# Patient Record
Sex: MALE | Race: BLACK OR AFRICAN AMERICAN | URBAN - METROPOLITAN AREA
[De-identification: names, ages, dates, MRNs, and addresses within clinical notes are randomized per-mention and may not be internally consistent; named-entity substitution may affect disease eponyms.]

---

## 2021-06-30 DIAGNOSIS — L02.01 CUTANEOUS ABSCESS OF FACE: Primary | ICD-10-CM

## 2021-06-30 LAB
GRAM STN SPEC: ABNORMAL
GRAM STN SPEC: ABNORMAL
Lab: ABNORMAL
SPECIMEN SOURCE: ABNORMAL

## 2021-06-30 PROCEDURE — 87205 SMEAR GRAM STAIN: CPT | Performed by: DENTIST

## 2021-06-30 PROCEDURE — 87070 CULTURE OTHR SPECIMN AEROBIC: CPT | Performed by: DENTIST

## 2021-06-30 PROCEDURE — 87186 SC STD MICRODIL/AGAR DIL: CPT | Performed by: DENTIST

## 2021-06-30 PROCEDURE — 87077 CULTURE AEROBIC IDENTIFY: CPT | Performed by: DENTIST

## 2021-06-30 PROCEDURE — 87075 CULTR BACTERIA EXCEPT BLOOD: CPT | Performed by: DENTIST

## 2021-07-03 LAB
BACTERIA SPEC CULT: ABNORMAL
Lab: ABNORMAL
SPECIMEN SOURCE: ABNORMAL

## 2021-07-07 LAB
BACTERIA SPEC CULT: NORMAL
BACTERIA SPEC CULT: NORMAL
Lab: NORMAL
SPECIMEN SOURCE: NORMAL

## 2021-08-16 ENCOUNTER — ALLIED HEALTH/NURSE VISIT (OUTPATIENT)
Dept: FAMILY MEDICINE | Facility: CLINIC | Age: 31
End: 2021-08-16
Payer: COMMERCIAL

## 2021-08-16 ENCOUNTER — OFFICE VISIT (OUTPATIENT)
Dept: FAMILY MEDICINE | Facility: CLINIC | Age: 31
End: 2021-08-16
Payer: COMMERCIAL

## 2021-08-16 VITALS
WEIGHT: 154.2 LBS | HEART RATE: 71 BPM | SYSTOLIC BLOOD PRESSURE: 115 MMHG | TEMPERATURE: 98.1 F | RESPIRATION RATE: 20 BRPM | OXYGEN SATURATION: 99 % | DIASTOLIC BLOOD PRESSURE: 79 MMHG

## 2021-08-16 DIAGNOSIS — Z23 HIGH PRIORITY FOR 2019-NCOV VACCINE: Primary | ICD-10-CM

## 2021-08-16 DIAGNOSIS — L89.892 PRESSURE INJURY OF LEFT KNEE, STAGE 2 (H): ICD-10-CM

## 2021-08-16 DIAGNOSIS — L03.116 CELLULITIS OF LEFT LOWER EXTREMITY: ICD-10-CM

## 2021-08-16 DIAGNOSIS — Z51.89 ENCOUNTER FOR WOUND CARE: Primary | ICD-10-CM

## 2021-08-16 PROCEDURE — 99204 OFFICE O/P NEW MOD 45 MIN: CPT | Mod: 25 | Performed by: STUDENT IN AN ORGANIZED HEALTH CARE EDUCATION/TRAINING PROGRAM

## 2021-08-16 PROCEDURE — 91301 COVID-19,PF,MODERNA: CPT | Performed by: STUDENT IN AN ORGANIZED HEALTH CARE EDUCATION/TRAINING PROGRAM

## 2021-08-16 PROCEDURE — 0011A COVID-19,PF,MODERNA: CPT | Performed by: STUDENT IN AN ORGANIZED HEALTH CARE EDUCATION/TRAINING PROGRAM

## 2021-08-16 PROCEDURE — 99207 PR NO CHARGE NURSE ONLY: CPT

## 2021-08-16 RX ORDER — BACITRACIN ZINC 500 [USP'U]/G
OINTMENT TOPICAL 2 TIMES DAILY
Qty: 425 G | Refills: 1 | Status: SHIPPED | OUTPATIENT
Start: 2021-08-16

## 2021-08-16 RX ORDER — TRAMADOL HYDROCHLORIDE 50 MG/1
50 TABLET ORAL 2 TIMES DAILY
COMMUNITY

## 2021-08-16 RX ORDER — IBUPROFEN 600 MG/1
600 TABLET, FILM COATED ORAL EVERY 6 HOURS PRN
COMMUNITY

## 2021-08-16 NOTE — PROGRESS NOTES
Preceptor Attestation:  I discussed the patient with the resident and evaluated the patient in person. I have verified the content of the note, which accurately reflects my assessment of the patient and the plan of care.  Supervising Physician:  Breanna Henry MD.

## 2021-08-16 NOTE — PROGRESS NOTES
"Pt presented to [facility] complaining of a wound on his LLE, anterior, a few inches below the knee. Pt endorses being caught in the rain on Friday \"for 4 to 5 hours\", and his soaking-wet jeans rubbed against that spot. Pt denies any other known trauma to that area.    Wound is around 15 mm in diameter, is pink and white with spots of blood but no scabbing, and is crater-like in appearance. It looks to this RN like a Stage 2 pressure injury.    There is a much smaller secondary wound more proximate to the knee (above and to the right of the primary wound). It is not (yet?) cratered, and is similar in appearance to an insect bite that's been scratched.    This RN irrigated the primary wound, dried it with gauze, and applied a 2\" x 3.75\" band-aid with bacitracin ointment on its pad. (Would have applied a Mepilex dressing, but only have one size here and it would have been much too large.) The band-aid covers both wounds.    Pt to be examined later today by Dr. Cole at [facility] at 1330.    Anival Guillen, RN  [facility]  " Bedside and Verbal shift change report given to Temi Biswas RN (oncoming nurse) by Tasha Crawford RN (offgoing nurse). Report included the following information SBAR, Kardex, Intake/Output, MAR, Recent Results and Med Rec Status.

## 2021-08-16 NOTE — PROGRESS NOTES
"[facility] Visit    Assessment & Plan     1. High priority for 2019-nCoV vaccine  - COVID 19 vaccine today  - Second dose in 4 months    2. Cellulitis of left lower extremity  - amoxicillin-clavulanate (AUGMENTIN) 875-125 MG tablet; Take 1 tablet by mouth 2 times daily for 10 days  Dispense: 20 tablet; Refill: 0  - bacitracin 500 UNIT/GM external ointment; Apply topically 2 times daily  Dispense: 425 g; Refill: 1    3. Pressure injury of left knee, stage 2 (H)  Continue follow-up with wound care with [facility] RN. Recheck in 10 days at [facility].      Patient Instructions   Take the antibiotic twice daily for 10 days. If increased redness, fevers, chills return to clinic for further evaluation.    Use the cream on the wound twice daily.    Second COVID vaccine in one month.    Options for treatment and follow-up care were reviewed with the patient who was engaged and actively involved in the decision making process, verbalized understanding of the options discussed, and satisfied with the final plan.    Patient was staffed with supervising physician, Dr. Carl Boyle MD, PGY2  [facility]    Subjective   Georges Rice is a 31 year old male with a history including MSSA infection in 6/2021 who presents with leg wound.    Chief Complaints and History of Present Illnesses   Patient presents with     Derm Problem     left leg wound, poss infection per pt     Imm/Inj     COVID-19 VACCINE       Per patient report and [facility] RN, Mr Rice endorses being caught in the rain on Friday for several hours.  He had a laceration that was small on his left knee prior to this.  His jeans rubbed up against this wound and caused an ulceration.  He has had increasing pain and loss of skin in this area since this time.  It has become painful to touch as well.  Per [facility] RN report     \"Wound is around 15 mm in diameter, is pink and white with spots of " "blood but no scabbing, and is crater-like in appearance.. Stage 2 pressure injury. There is a much smaller secondary wound more proximate to the knee (above and to the right of the primary wound). It is not (yet?) cratered, and is similar in appearance to an insect bite that's been scratched.\"     The wound is irrigated at [facility] and he is recommended to be seen today for further evaluation.  He confirms the story as noted above, has been present for about 4 days and increasing in size.  He has not had fevers, chills, sweats.  No antibiotic allergies.  States he did have an infection on his chin but never heard what type of infection it was.  Per chart review appears to be MSSA.    Current Outpatient Medications   Medication Instructions     ibuprofen (ADVIL/MOTRIN) 600 mg, Oral, EVERY 6 HOURS PRN     traMADol (ULTRAM) 50 mg, Oral, 2 TIMES DAILY       Social: He reports that he has been smoking. He has never used smokeless tobacco. He reports current alcohol use. He reports current drug use. Drug: Marijuana.    There are no exam notes on file for this visit.    Objective     Vitals:    08/16/21 1417   BP: 115/79   Pulse: 71   Resp: 20   Temp: 98.1  F (36.7  C)   TempSrc: Oral   SpO2: 99%   Weight: 69.9 kg (154 lb 3.2 oz)     There is no height or weight on file to calculate BMI.    GEN: NAD, healthy, alert  HEENT: NC/AT, EOMI, normal conjunctivae/sclerae, clear oropharynx, MMM  RESP: CTAB, no w/r/r  CV: RRR, nl S1/S2, no m/r/g, no peripheral edema  ABD: soft, NT/ND, +BS throughout  MSK: no MSK defects noted, gait is age appropriate w/o ataxia  SKIN: There is a circular 2 cm stage II pressure ulcer on the lateral side of the knee near the tibial plateau, no surrounding erythema or weeping.  Is covered with a bandage without crepitus.  Above at the 2 o'clock position there is a small 2 mm pustule-like wound with slight surrounding erythema.  NEURO: no obvious focal deficits  PSYCH: mentation appears normal, " affect normal/bright

## 2021-08-16 NOTE — PATIENT INSTRUCTIONS
Take the antibiotic twice daily for 10 days. If increased redness, fevers, chills return to clinic for further evaluation.    Use the cream on the wound twice daily.    Second COVID vaccine in one month.

## 2021-08-19 DIAGNOSIS — L89.892 PRESSURE INJURY OF LEFT KNEE, STAGE 2 (H): Primary | ICD-10-CM

## 2021-12-15 ENCOUNTER — ALLIED HEALTH/NURSE VISIT (OUTPATIENT)
Dept: FAMILY MEDICINE | Facility: CLINIC | Age: 31
End: 2021-12-15
Payer: COMMERCIAL

## 2021-12-15 DIAGNOSIS — J06.9 UPPER RESPIRATORY TRACT INFECTION, UNSPECIFIED TYPE: Primary | ICD-10-CM

## 2021-12-15 PROCEDURE — 99207 PR NO CHARGE NURSE ONLY: CPT

## 2021-12-20 NOTE — PROGRESS NOTES
Pt presented to [facility] complaining of sore throat, swollen glands over past 24 hrs or so. No other symptoms. Temp = 98.2 F.    This RN instructed pt to go to Urgent care or [facility] if symptoms worsen, but that it appears that he either has a low-impact cold of some kind, or that his body is fighting one off. Pt to let RN know tomorrow if his condition worsens.    Anival Guillen, RN  [facility]

## 2022-03-04 ENCOUNTER — ALLIED HEALTH/NURSE VISIT (OUTPATIENT)
Dept: FAMILY MEDICINE | Facility: CLINIC | Age: 32
End: 2022-03-04
Payer: COMMERCIAL

## 2022-03-04 VITALS — DIASTOLIC BLOOD PRESSURE: 80 MMHG | SYSTOLIC BLOOD PRESSURE: 126 MMHG | HEART RATE: 69 BPM | TEMPERATURE: 98.8 F

## 2022-03-04 DIAGNOSIS — Z91.89 AT RISK FOR DEHYDRATION: ICD-10-CM

## 2022-03-04 DIAGNOSIS — Z71.89 COUNSELING AND COORDINATION OF CARE: Primary | ICD-10-CM

## 2022-03-04 PROCEDURE — 99207 PR NO CHARGE NURSE ONLY: CPT

## 2022-03-04 NOTE — PROGRESS NOTES
This RN was sent to pt's room by staff, on report that pt is sick. Pt complains of N/V over past 24 hrs, with dizziness. So far today he is vomiting only liquid, and defecating liquid with some blood. VS are normal (Temp 98.8 F oral, /80, HR 69 bpm.)    RN got pt a 1400 appt at [facility], his preferred clinic. Pt said his fiance will take him, and he left the [facility] around 1300 and is not yet back. However, at 1447 a triage RN from that clinic called this RN to report that pt missed his appt.    The triage RN stated that their plan was to put pt on IVF immediately on arrival based on his reported combination of N/V over 24 hrs resulting in dizziness. She requested that this RN make arrangements for pt to go directly to Urgent Care or the ER on his return to [place] (from wherever it is that he's been).    RN sent a detailed text message to Yesica Duvall and Frank Montes, 2nd & 3rd Shift Managers, respectively, to pass this information. RN advised them that he should go to Urgent Care or ER immediately unless he's been able to resume eating & drinking without vomiting or diarrhea, and begun rehydrating himself orally.    Anival Guillen, PETER  [facility]